# Patient Record
Sex: FEMALE | Race: OTHER
[De-identification: names, ages, dates, MRNs, and addresses within clinical notes are randomized per-mention and may not be internally consistent; named-entity substitution may affect disease eponyms.]

---

## 2019-09-06 ENCOUNTER — HOSPITAL ENCOUNTER (INPATIENT)
Dept: HOSPITAL 74 - JLDR | Age: 34
LOS: 3 days | Discharge: HOME | End: 2019-09-09
Attending: OBSTETRICS & GYNECOLOGY | Admitting: OBSTETRICS & GYNECOLOGY
Payer: COMMERCIAL

## 2019-09-06 VITALS — BODY MASS INDEX: 26.5 KG/M2

## 2019-09-06 DIAGNOSIS — Z3A.40: ICD-10-CM

## 2019-09-06 DIAGNOSIS — O48.0: Primary | ICD-10-CM

## 2019-09-06 LAB
ANION GAP SERPL CALC-SCNC: 15 MMOL/L (ref 8–16)
APTT BLD: 30.3 SECONDS (ref 25.2–36.5)
BASOPHILS # BLD: 0.7 % (ref 0–2)
BUN SERPL-MCNC: 13.1 MG/DL (ref 7–18)
CALCIUM SERPL-MCNC: 8.2 MG/DL (ref 8.5–10.1)
CHLORIDE SERPL-SCNC: 105 MMOL/L (ref 98–107)
CO2 SERPL-SCNC: 20 MMOL/L (ref 21–32)
CREAT SERPL-MCNC: 0.8 MG/DL (ref 0.55–1.3)
DEPRECATED RDW RBC AUTO: 13.9 % (ref 11.6–15.6)
EOSINOPHIL # BLD: 0.3 % (ref 0–4.5)
GLUCOSE SERPL-MCNC: 81 MG/DL (ref 74–106)
HCT VFR BLD CALC: 36 % (ref 32.4–45.2)
HGB BLD-MCNC: 12.2 GM/DL (ref 10.7–15.3)
INR BLD: 0.96 (ref 0.83–1.09)
LYMPHOCYTES # BLD: 17.4 % (ref 8–40)
MCH RBC QN AUTO: 29.3 PG (ref 25.7–33.7)
MCHC RBC AUTO-ENTMCNC: 33.7 G/DL (ref 32–36)
MCV RBC: 86.9 FL (ref 80–96)
MONOCYTES # BLD AUTO: 12 % (ref 3.8–10.2)
NEUTROPHILS # BLD: 69.6 % (ref 42.8–82.8)
PLATELET # BLD AUTO: 183 K/MM3 (ref 134–434)
PMV BLD: 9.8 FL (ref 7.5–11.1)
POTASSIUM SERPLBLD-SCNC: 3.8 MMOL/L (ref 3.5–5.1)
PT PNL PPP: 11.3 SEC (ref 9.7–13)
RBC # BLD AUTO: 4.14 M/MM3 (ref 3.6–5.2)
SODIUM SERPL-SCNC: 140 MMOL/L (ref 136–145)
WBC # BLD AUTO: 7.6 K/MM3 (ref 4–10)

## 2019-09-06 NOTE — PN
Ante-Partal Exam





- Subjective


Subjective: 





Patient comfortable s/p epidural 


Vital Signs: 


 Vital Signs











Temperature  98.7 F   09/06/19 18:00


 


Pulse Rate  84   09/06/19 18:45


 


Respiratory Rate  20   09/06/19 18:45


 


Blood Pressure  115/73   09/06/19 18:45


 


O2 Sat by Pulse Oximetry (%)  100   09/06/19 18:45











Bleeding: No


Headache: No


Visual changes: No


Right upper quadrant pain: No





- Contractions


Contractions: Yes


Regularity: Regular





- Exam during Labor


Fetal Heart Rate: 130


Variability: Moderate


Category: I


Fetal Monitor Accelerations: Present


Fetal Monitor Decelerations: None


Exam: Vaginal


Dilatation (cm): 9


Effacement (%): 100


Amniotic Membrane Status: Intact


Fetal Presentation: Vertex


Fetal Station: 0





- Intrapartum Hemorrhage Risk


Medium Risk Factors: None


High Risk Factors: None


Risk Score: 0


Risk Level: Low Risk





- Assessment/Plan


Assessment/Plan: 





34 yo active labor


1. Good cervical change


2. GBS neg


3. Category I FHT 


4. Pain well controlled with epidural 


5. Will proceed with expectant management

## 2019-09-06 NOTE — HP
Past Medical History





- Primary Care Physician


PCP:: Saqib Jameson





- Admission


Chief Complaint: 34yo P0 with pregnancy at EGA 40w2d admitted with spontaneous 

labor.


History of Present Illness: 





Pregnancy complicated by:


post term





History Source: Patient, Medical Record


Limitations to Obtaining History: No Limitations





- Past Medical History


CNS: No: Alzheimer's, CVA, Dementia, Migraine, Multiple Sclerosis, Peripheral 

Neuropathy, Parkinson's, Seizure, Syncope, TIA, Vertigo, Other


Cardiovascular: No: AFIB, Aneurysm, Aortic Insufficiency, Aortic Stenosis, CAD, 

CHF, Deep Vein Thrombosis, HTN, Hyperlipdemia, MI, Mitral Insufficiency, Mitral 

Stenosis, Murmur, Pulmonary Hypertension, Other


Pulmonary: No: Asthma, Bronchitis, Cancer, COPD, O2 Dependent, Pneumonia, 

Previously Intubated, Pulmonary Embolus, Pulmonary Fibrosis, Sleep Apnea, Other


Gastrointestinal: No: Ascites, Cancer, Constipation, Crohn's Disease, 

Diverticulitis, Diverticulosis, Esophageal Varices, Gastritis, GERD, GI Bleed, 

Hemorrhoids, Hiatal Hernia, Inflamatory Bowel Disease, Irritable Bowel Disease, 

Pancreatitis, Peptic Ulcer Disease, Ulcerative Colitis, Other


Hepatobiliary: No: Cirrhosis, Cholelithiasis, Cholecystitis, Choledocholithiasis

, Hepatitis A, Hepatitis B, Hepatitis C, Other


Renal/: No: Renal Failure, Renal Inusuff, BPH, Cancer, Hematuria, Hemodialysis

, Neurogenic Bladder, Renal Calculi, UTI, Other


Reproductive: No: Ectopic Pregnancy, Endometriosis, Fibroids, PID, Polycystic 

Ovary Syndrome, Postmenopausal, Other


...: 2


...Para: 0


...Induced : 1


...LMP: 18


... Weeks Gestation by Dates: 40.2


...EDC by Dates: 19


...EDC by Sono: 19


Heme/Onc: No: Anemia, B12 Deficiency, Bleeding Disorder, Cancer, Current 

Chemotherapy, Current Radiation Therapy, Hemochromatosis, Hypercoaguable State, 

Myeloproliferative Synd, Sickle Cell Disease, Sickle Cell Trait, 

Thrombocytopenia, Other


Infectious Disease: No: AIDS, C-Diff, Herpes Zoster, HIV, MRSA, STD's, 

Tuberculosis, VREF, Other


Psych: No: Addictions, Anxiety, Bipolar, Depression, Panic, Psychosis, 

Schizophrenia, Other


Musculoskeletal: No: Bursitis, Chronic low back pain, Hemiparesis, Hemiplegia, 

Osteoarthritis, Paraplegia, Other


Rheumatology: No: Fibromyalgia, Gout, Lupus, Rheumatoid Arthritis, Sarcoidosis, 

Vasculitis, Other


ENT: No: Allergic Rhinitis, Sinusitis, Other


Endocrine: No: [place]'s Disease, Cushing's Disease, Diabetes Insipidus, 

Diabetes Mellitus, Hyperparathyroidism, Hyperthyroidism, Hypothyroidism, 

Osteopenia, SIADH, Other


Dermatology: No: Basal Cell, Cellulitis, Eczema, Melanoma, Psoriasis, Squamous 

Cell, Other





- Past Surgical History


Hx Myomectomy: No


Hx Transabdominal Cerclage: No


Additional Surgical History: Brazilian butt lift





- Smoking History


Smoking history: Never smoked


Have you smoked in the past 12 months: No





- Alcohol/Substance Use


Hx Alcohol Use: No


History of Substance Use: reports: None





- Social History


Usual Living Arrangement: Yes: With Spouse


ADL: Independent


History of Recent Travel: No





Home Medications





- Allergies


Allergies/Adverse Reactions: 


 Allergies











Allergy/AdvReac Type Severity Reaction Status Date / Time


 


No Known Allergies Allergy   Verified 19 09:08














- Home Medications


Home Medications: 


Ambulatory Orders





Prenatal Vitamins (Sjr) - 1 tab PO DAILY 19 











Family Disease History





- Family Disease History


Family Disease History: Other: Mother (HTN, hyperlipidemia)





Review of Systems





- Review of Systems


Constitutional: reports: No Symptoms


Eyes: reports: No Symptoms


HENT: reports: No Symptoms


Neck: reports: No Symptoms


Cardiovascular: reports: No Symptoms


Respiratory: reports: No Symptoms


Gastrointestinal: reports: No Symptoms


Genitourinary: reports: No Symptoms


Breasts: reports: No Symptoms Reported


Musculoskeletal: reports: No Symptoms


Integumentary: reports: No Symptoms


Neurological: reports: No Symptoms


Endocrine: reports: No Symptoms


Hematology/Lymphatic: reports: No Symptoms


Psychiatric: reports: No Symptoms


Pain Intensity: 8





Physical Exam - Maternity


Constitutional: Yes: Well Nourished, No Distress, Calm


Eyes: Yes: WNL, Conjunctiva Clear


HENT: Yes: WNL, Atraumatic, Normocephalic


Neck: Yes: WNL, Supple, Trachea Midline


Cardiovascular: Yes: WNL, Regular Rate and Rhythm


Lungs: Clear to auscultation, Normal air movement


Breast(s): Yes: WNL





- Abdominal Exam/OB


Fundal Height: 40


Number of Fetuses: Single


Fetal Presentation: Vertex


Contractions: Yes


Regularity: Regular


Intensity: Mod/Strong


Fetal Monitor Mode: External


Fetal Heart Rate (range): 120


Fetal Heart Rate Location: Midline


Category: I


Accelerations: None


Decelerations: None





- Vaginal Exam/OB


Vaginal Bleediing: No


Speculum Exam: No


Dilatation (cm): 5


Effacement (%): 90


Amniotic Membrane Status: Intact


Fetal Presentation: Vertex/Position


Fetal Station: 0





- Physical Exam


Musculoskeletal: Yes: WNL


Extremities: Yes: WNL


Edema: No


Integumentary: Yes: WNL


Deep Tendon Reflex Grade: Normal +2


...Motor Strength: WNL


Psychiatric: Yes: WNL, Alert, Oriented





- Labs


Lab Results: 


 CBC, BMP





 19 09:36 











Hemorrhage Risk Assessment





- Risk Factors


Medium Risk Factors: Yes: None


High Risk Factors: Yes: None


Risk Score: 1


Risk Level: Medium Risk





Imaging





- Results


Ultrasound: Report Reviewed





Assessment/Plan





34yo P0 with pregnancy at EGA 40w2d admitted with spontaneous labor. Fetus with 

Category I tracing. Labour in early active phase. Plan to monitor labor 

progress. Pt requested epidural. Anesthesia called. Anticipate .

## 2019-09-07 PROCEDURE — 0HQ9XZZ REPAIR PERINEUM SKIN, EXTERNAL APPROACH: ICD-10-PCS | Performed by: OBSTETRICS & GYNECOLOGY

## 2019-09-07 RX ADMIN — FERROUS SULFATE TAB EC 324 MG (65 MG FE EQUIVALENT) SCH MG: 324 (65 FE) TABLET DELAYED RESPONSE at 17:46

## 2019-09-07 RX ADMIN — FERROUS SULFATE TAB EC 324 MG (65 MG FE EQUIVALENT) SCH MG: 324 (65 FE) TABLET DELAYED RESPONSE at 12:36

## 2019-09-07 RX ADMIN — FERROUS SULFATE TAB EC 324 MG (65 MG FE EQUIVALENT) SCH MG: 324 (65 FE) TABLET DELAYED RESPONSE at 08:57

## 2019-09-07 RX ADMIN — Medication SCH TAB: at 10:14

## 2019-09-07 NOTE — PN
Delivery





- Delivery


Vaginal Delivery: No Problems


Type of Anesthesia: Epidural


Episiotomy/Laceration: 1st degree


EBL (cc): 300





Delivery, Single Birth





- Stages of Labor


Date 1st Stage Initiatied: 19


Time 1st Stage Initiated: 05:30


Date 2nd Stage Initiated: 19


Time 2nd Stage Initiated: 22:10


Date of Delivery: 19


Time of Delivery: 00:37


Date Placenta Delivered: 19


Time Placenta Delivered: 00:50


Placenta: Yes: Spontaneous





- Condition of Infant


Infant Gender: Male


Position: Left, OA


Total Hours ROM (Hrs/Mins): 4 hours 52 minutes





- Apgar


  ** 1 Minute


Apgar Total Score: 9





  ** 5 Minutes


Apgar Total Score: 9





-  Feeding Plan


Initial Plan: Elected not to breastfeed exclusively throughout hospitalization





Remarks





- Remarks


Remarks: 


Patient progressed to fully dilated and at 0037 via  delivered a viable 

male infant in ALO position, APGARs 9,9.  Weight and length unknown at this 

time. 


Head delivered spontaneously followed by shoulders and body without difficulty. 


Infant with spontaneous cry and placed on mother's abdomen.  


Nose and mouth was bulb suctioned. 


Cord was clamped and cut. 


Perineum and vagina examined, a first degree laceration was noted and repaired 

in the usual fashion. 


Placenta was delivered spontaneously and intact. 


20 units of pitocin in 1 L IVF was given. 


All counts correct x 2. 


Mother and infant stable in LDR. 


EBL 300cc.

## 2019-09-08 LAB
BASOPHILS # BLD: 0.3 % (ref 0–2)
DEPRECATED RDW RBC AUTO: 14.5 % (ref 11.6–15.6)
EOSINOPHIL # BLD: 1.1 % (ref 0–4.5)
HCT VFR BLD CALC: 30.3 % (ref 32.4–45.2)
HGB BLD-MCNC: 10 GM/DL (ref 10.7–15.3)
LYMPHOCYTES # BLD: 22.5 % (ref 8–40)
MCH RBC QN AUTO: 29.3 PG (ref 25.7–33.7)
MCHC RBC AUTO-ENTMCNC: 33.1 G/DL (ref 32–36)
MCV RBC: 88.7 FL (ref 80–96)
MONOCYTES # BLD AUTO: 12.2 % (ref 3.8–10.2)
NEUTROPHILS # BLD: 63.9 % (ref 42.8–82.8)
PLATELET # BLD AUTO: 152 K/MM3 (ref 134–434)
PMV BLD: 9.4 FL (ref 7.5–11.1)
RBC # BLD AUTO: 3.42 M/MM3 (ref 3.6–5.2)
WBC # BLD AUTO: 16.4 K/MM3 (ref 4–10)

## 2019-09-08 RX ADMIN — FERROUS SULFATE TAB EC 324 MG (65 MG FE EQUIVALENT) SCH MG: 324 (65 FE) TABLET DELAYED RESPONSE at 08:36

## 2019-09-08 RX ADMIN — FERROUS SULFATE TAB EC 324 MG (65 MG FE EQUIVALENT) SCH MG: 324 (65 FE) TABLET DELAYED RESPONSE at 12:08

## 2019-09-08 RX ADMIN — Medication SCH TAB: at 09:48

## 2019-09-08 RX ADMIN — FERROUS SULFATE TAB EC 324 MG (65 MG FE EQUIVALENT) SCH MG: 324 (65 FE) TABLET DELAYED RESPONSE at 17:14

## 2019-09-08 NOTE — PN
Post Partum Progress Note





- Subjective


Subjective: 


Patient without acute complaints. 


Reports tolerating oral intake without nausea or vomiting. 


Ambulating without dizziness. 


Denies fevers or chills.


Pain well controlled with oral pain medication.


Breastfeeding without difficulty.


Passing flatus.





Post Partum Day: 1


Type of Delivery: 


Vital Signs: 


 Vital Signs











Temperature  97.8 F   19 20:38


 


Pulse Rate  65   19 20:38


 


Respiratory Rate  20   19 20:38


 


Blood Pressure  133/87   19 20:38


 


O2 Sat by Pulse Oximetry (%)  100   19 01:35











Breast Exam: Yes: Soft


Uterus: Yes: Fundus Firm, Fundus below umbilicus


Abdomen/GI: Yes: Abdomen soft, Passing flatus, Tolerating PO.  No: Abdominal 

Distention, Tender


Lochia: Yes: Rubra


Lochia, amount: Small


Extremities: Yes: Calves non-tender, Edema (trace)


Activity: Ambulating





- Labs


Labs: 


 CBC











WBC  16.4 K/mm3 (4.0-10.0)  H  19  06:00    


 


RBC  3.42 M/mm3 (3.60-5.2)  L  19  06:00    


 


Hgb  10.0 GM/dL (10.7-15.3)  L  19  06:00    


 


Hct  30.3 % (32.4-45.2)  L D 19  06:00    


 


MCV  88.7 fl (80-96)   19  06:00    


 


MCH  29.3 pg (25.7-33.7)   19  06:00    


 


MCHC  33.1 g/dl (32.0-36.0)   19  06:00    


 


RDW  14.5 % (11.6-15.6)   19  06:00    


 


Plt Count  152 K/MM3 (134-434)   19  06:00    


 


MPV  9.4 fl (7.5-11.1)   19  06:00    


 


Absolute Neuts (auto)  10.5 K/mm3 (1.5-8.0)  H  19  06:00    


 


Neutrophils %  63.9 % (42.8-82.8)   19  06:00    


 


Lymphocytes %  22.5 % (8-40)  D 19  06:00    


 


Monocytes %  12.2 % (3.8-10.2)  H  19  06:00    


 


Eosinophils %  1.1 % (0-4.5)  D 19  06:00    


 


Basophils %  0.3 % (0-2.0)   19  06:00    


 


Nucleated RBC %  0 % (0-0)   19  06:00    














Assessment/Plan





34 yo PPD # 1 s/p , afebrile, vital signs stable, doing well 


1. Continue routine postpartum  care. 


2. AM CBC with mild asymptomatic anemia


3. Rh positive status, no rhogam indicated. 


4. Encourage ambulation 


5. Continue oral pain medication


6. Anticipate discharge home postpartum day #2


7. Patient states desires circumcision for infant. 


Discussed risks including infection, bleeding, damage to tip of penis, and 

unsatisfactory result, resulting in surgical repair or repeat circumcision. 


Patient expressed understanding and consents to procedure. 


Reviewed infants chart, cleared for circumcision and normal genitalia per 

pediatrician, Dr. Rutherford

## 2019-09-09 VITALS — SYSTOLIC BLOOD PRESSURE: 111 MMHG | TEMPERATURE: 97.6 F | DIASTOLIC BLOOD PRESSURE: 69 MMHG | HEART RATE: 70 BPM

## 2019-09-09 RX ADMIN — Medication SCH TAB: at 09:00

## 2019-09-09 RX ADMIN — FERROUS SULFATE TAB EC 324 MG (65 MG FE EQUIVALENT) SCH MG: 324 (65 FE) TABLET DELAYED RESPONSE at 08:59

## 2019-09-09 NOTE — DS
Physical Exam-GYN


Vital Signs: 


 Vital Signs











Temperature  98.0 F   19 21:28


 


Pulse Rate  75   19 21:28


 


Respiratory Rate  20   19 21:28


 


Blood Pressure  116/74   19 21:28


 


O2 Sat by Pulse Oximetry (%)  100   19 01:35











Labs: 


 CBC, BMP





 19 06:00 





 19 09:36 











Delivery





- Delivery


Vaginal Delivery: No Problems


Type of Anesthesia: Epidural


Episiotomy/Laceration: 1st degree


EBL (cc): 300





Delivery, Single Birth





- Stages of Labor


Date 1st Stage Initiatied: 19


Time 1st Stage Initiated: 05:30


Date 2nd Stage Initiated: 19


Time 2nd Stage Initiated: 22:10


Date of Delivery: 19


Time of Delivery: 00:37


Time Placenta Delivered: 00:50


Placenta: Yes: Spontaneous





- Condition of Infant


Pediatrician/Neonatologist Present: No


Infant Gender: Male


Birth Weight: 6 lb 4 oz


Position: Left, OA


Total Hours ROM (Hrs/Mins): 4 hours 39 minutes





- Apgar


  ** 1 Minute


Apgar Total Score: 9





  ** 5 Minutes


Apgar Total Score: 9





- [place] Feeding Plan


Initial Plan: Elected not to breastfeed exclusively throughout hospitalization





Discharge Summary


Reason For Visit: LABOR ADMISSION


Current Active Problems





Anemia (Acute)


Vaginal delivery (Acute)








Procedures: Principal: Vaginal delivery


Hospital Course: 





Patient admitted in active labor


She progressed to deliver via  a viable male infant. 


PPD # 1 patient ambulated, voiding, passing gas, tolerating oral intake and 

with adequate pain control.


She fulfilled all criteria for discharge PPD #2


Condition: Good





- Instructions


Diet, Activity, Other Instructions: 


Follow up in 4-6 weeks for postpartum visit 





Physical activity


Resume your normal everyday activity as tolerated no heavy lifting or exercise 

until seen by your surgeon. You may walk unlimited edyta of and climb stairs. 

You may resume driving the car when you feel safe and comfortable behind the 

wheel. No sexual activity as instructed.





Diet


There are no dietary restrictions. Eat healthy, high-fiber foods. Drink 6 to 8 

glasses of liquid each day. This will assist in keeping your bowels are regular.





Pain management


You may take Tylenol or acetaminophen or Ibuprofen (for example, Motrin, Advil 

etc.) for pain.





Call MD for any of the following:


Severe pain not relieved by medication


Fever of 101 or higher


Excessive bleeding or drainage on dressing


Inability to urinate

















Referrals: 


Graciela Figueroa MD [Staff Physician] - 


Radha Duncan MD [Staff Physician] - 


Disposition: HOME





- Home Medications


Comprehensive Discharge Medication List: 


Ambulatory Orders





Prenatal Vitamins (Sjr) - 1 tab PO DAILY 19

## 2019-09-09 NOTE — PN
Post Partum Progress Note





- Subjective


Subjective: 


Patient without acute complaints. 


Reports tolerating oral intake without nausea or vomiting. 


Ambulating without dizziness. 


Denies fevers or chills.


Pain well controlled with oral pain medication.


Breastfeeding without difficulty.


Passing flatus.





Post Partum Day: 2


Type of Delivery: 


Vital Signs: 


 Vital Signs











Temperature  98.0 F   19 21:28


 


Pulse Rate  75   19 21:28


 


Respiratory Rate  20   19 21:28


 


Blood Pressure  116/74   19 21:28


 


O2 Sat by Pulse Oximetry (%)  100   19 01:35














- Labs


Labs: 


 CBC











WBC  16.4 K/mm3 (4.0-10.0)  H  19  06:00    


 


RBC  3.42 M/mm3 (3.60-5.2)  L  19  06:00    


 


Hgb  10.0 GM/dL (10.7-15.3)  L  19  06:00    


 


Hct  30.3 % (32.4-45.2)  L D 19  06:00    


 


MCV  88.7 fl (80-96)   19  06:00    


 


MCH  29.3 pg (25.7-33.7)   19  06:00    


 


MCHC  33.1 g/dl (32.0-36.0)   19  06:00    


 


RDW  14.5 % (11.6-15.6)   19  06:00    


 


Plt Count  152 K/MM3 (134-434)   19  06:00    


 


MPV  9.4 fl (7.5-11.1)   19  06:00    


 


Absolute Neuts (auto)  10.5 K/mm3 (1.5-8.0)  H  19  06:00    


 


Neutrophils %  63.9 % (42.8-82.8)   19  06:00    


 


Lymphocytes %  22.5 % (8-40)  D 19  06:00    


 


Monocytes %  12.2 % (3.8-10.2)  H  19  06:00    


 


Eosinophils %  1.1 % (0-4.5)  D 19  06:00    


 


Basophils %  0.3 % (0-2.0)   19  06:00    


 


Nucleated RBC %  0 % (0-0)   19  06:00    














Assessment/Plan





32 yo PPD # 1 s/p , afebrile, vital signs stable, doing well 


1. Patient stable for discharge home today.


2. Patient encouraged to contact MD for:


- Severe pain not controlled by oral pain medication


- Fevers or chills


- Nausea or vomiting, intolerance of oral intake


3. Patient to follow up in office in 4-6 weeks for postpartum visit

## 2020-12-12 ENCOUNTER — HOSPITAL ENCOUNTER (INPATIENT)
Dept: HOSPITAL 74 - JDEL | Age: 35
LOS: 1 days | Discharge: HOME | DRG: 560 | End: 2020-12-13
Attending: OBSTETRICS & GYNECOLOGY | Admitting: OBSTETRICS & GYNECOLOGY
Payer: COMMERCIAL

## 2020-12-12 VITALS — BODY MASS INDEX: 24.3 KG/M2

## 2020-12-12 DIAGNOSIS — Z3A.39: ICD-10-CM

## 2020-12-12 LAB
ANION GAP SERPL CALC-SCNC: 12 MMOL/L (ref 8–16)
APTT BLD: 25 SECONDS (ref 25.2–36.5)
BASE EXCESS BLDCOA CALC-SCNC: -3.8 MMOL/L (ref 0–2)
BASE EXCESS BLDCOA CALC-SCNC: -4.9 MMOL/L (ref 0–2)
BASOPHILS # BLD: 1 % (ref 0–2)
BUN SERPL-MCNC: 9.4 MG/DL (ref 7–18)
CALCIUM SERPL-MCNC: 8.8 MG/DL (ref 8.5–10.1)
CHLORIDE SERPL-SCNC: 105 MMOL/L (ref 98–107)
CO2 SERPL-SCNC: 20 MMOL/L (ref 21–32)
CREAT SERPL-MCNC: 0.5 MG/DL (ref 0.55–1.3)
DEPRECATED RDW RBC AUTO: 17.8 % (ref 11.6–15.6)
EOSINOPHIL # BLD: 3.1 % (ref 0–4.5)
GLUCOSE SERPL-MCNC: 93 MG/DL (ref 74–106)
HCO3 BLDCO-SCNC: 22.6 MMHG (ref 20–29)
HCO3 BLDCO-SCNC: 24.1 MMHG (ref 20–29)
HCT VFR BLD CALC: 35.9 % (ref 32.4–45.2)
HGB BLD-MCNC: 11.6 GM/DL (ref 10.7–15.3)
INR BLD: 0.94 (ref 0.83–1.09)
LYMPHOCYTES # BLD: 31.5 % (ref 8–40)
MCH RBC QN AUTO: 27.4 PG (ref 25.7–33.7)
MCHC RBC AUTO-ENTMCNC: 32.2 G/DL (ref 32–36)
MCV RBC: 85.1 FL (ref 80–96)
MONOCYTES # BLD AUTO: 12.6 % (ref 3.8–10.2)
NEUTROPHILS # BLD: 51.8 % (ref 42.8–82.8)
PCO2 BLDCO: 45.7 MMHG (ref 30–78)
PCO2 BLDCO: 60.9 MMHG (ref 30–78)
PH BLDCO: 7.21 [PH] (ref 7.14–7.44)
PH BLDCO: 7.31 [PH] (ref 7.14–7.44)
PLATELET # BLD AUTO: 225 K/MM3 (ref 134–434)
PMV BLD: 8.9 FL (ref 7.5–11.1)
POTASSIUM SERPLBLD-SCNC: 3.7 MMOL/L (ref 3.5–5.1)
PT PNL PPP: 11.6 SEC (ref 9.7–13)
RBC # BLD AUTO: 4.22 M/MM3 (ref 3.6–5.2)
SODIUM SERPL-SCNC: 137 MMOL/L (ref 136–145)
WBC # BLD AUTO: 6.9 K/MM3 (ref 4–10)

## 2020-12-12 PROCEDURE — U0003 INFECTIOUS AGENT DETECTION BY NUCLEIC ACID (DNA OR RNA); SEVERE ACUTE RESPIRATORY SYNDROME CORONAVIRUS 2 (SARS-COV-2) (CORONAVIRUS DISEASE [COVID-19]), AMPLIFIED PROBE TECHNIQUE, MAKING USE OF HIGH THROUGHPUT TECHNOLOGIES AS DESCRIBED BY CMS-2020-01-R: HCPCS

## 2020-12-12 PROCEDURE — C9803 HOPD COVID-19 SPEC COLLECT: HCPCS

## 2020-12-12 RX ADMIN — FERROUS SULFATE TAB EC 324 MG (65 MG FE EQUIVALENT) SCH MG: 324 (65 FE) TABLET DELAYED RESPONSE at 11:34

## 2020-12-12 RX ADMIN — FERROUS SULFATE TAB EC 324 MG (65 MG FE EQUIVALENT) SCH MG: 324 (65 FE) TABLET DELAYED RESPONSE at 22:01

## 2020-12-12 RX ADMIN — Medication SCH TAB: at 11:35

## 2020-12-13 VITALS — SYSTOLIC BLOOD PRESSURE: 112 MMHG | DIASTOLIC BLOOD PRESSURE: 74 MMHG | TEMPERATURE: 98.2 F | HEART RATE: 82 BPM

## 2020-12-13 LAB
BASOPHILS # BLD: 0.3 % (ref 0–2)
DEPRECATED RDW RBC AUTO: 18.9 % (ref 11.6–15.6)
EOSINOPHIL # BLD: 1.9 % (ref 0–4.5)
HCT VFR BLD CALC: 34.4 % (ref 32.4–45.2)
HGB BLD-MCNC: 11.1 GM/DL (ref 10.7–15.3)
LYMPHOCYTES # BLD: 22 % (ref 8–40)
MCH RBC QN AUTO: 28 PG (ref 25.7–33.7)
MCHC RBC AUTO-ENTMCNC: 32.4 G/DL (ref 32–36)
MCV RBC: 86.4 FL (ref 80–96)
MONOCYTES # BLD AUTO: 10 % (ref 3.8–10.2)
NEUTROPHILS # BLD: 65.8 % (ref 42.8–82.8)
PLATELET # BLD AUTO: 218 K/MM3 (ref 134–434)
PMV BLD: 8.8 FL (ref 7.5–11.1)
RBC # BLD AUTO: 3.98 M/MM3 (ref 3.6–5.2)
WBC # BLD AUTO: 12.2 K/MM3 (ref 4–10)

## 2020-12-13 RX ADMIN — FERROUS SULFATE TAB EC 324 MG (65 MG FE EQUIVALENT) SCH MG: 324 (65 FE) TABLET DELAYED RESPONSE at 10:47

## 2020-12-13 RX ADMIN — Medication SCH TAB: at 10:48
